# Patient Record
Sex: MALE | Race: WHITE
[De-identification: names, ages, dates, MRNs, and addresses within clinical notes are randomized per-mention and may not be internally consistent; named-entity substitution may affect disease eponyms.]

---

## 2019-02-25 ENCOUNTER — HOSPITAL ENCOUNTER (INPATIENT)
Dept: HOSPITAL 74 - YASAS | Age: 54
LOS: 2 days | Discharge: LEFT BEFORE DISCHARGE | DRG: 770 | End: 2019-02-27
Attending: SURGERY | Admitting: SURGERY
Payer: COMMERCIAL

## 2019-02-25 VITALS — BODY MASS INDEX: 21.7 KG/M2

## 2019-02-25 DIAGNOSIS — F14.20: ICD-10-CM

## 2019-02-25 DIAGNOSIS — R55: ICD-10-CM

## 2019-02-25 DIAGNOSIS — Z87.09: ICD-10-CM

## 2019-02-25 DIAGNOSIS — Z21: ICD-10-CM

## 2019-02-25 DIAGNOSIS — F10.230: Primary | ICD-10-CM

## 2019-02-25 DIAGNOSIS — Z86.69: ICD-10-CM

## 2019-02-25 DIAGNOSIS — R63.4: ICD-10-CM

## 2019-02-25 DIAGNOSIS — F11.20: ICD-10-CM

## 2019-02-25 PROCEDURE — HZ2ZZZZ DETOXIFICATION SERVICES FOR SUBSTANCE ABUSE TREATMENT: ICD-10-PCS | Performed by: SURGERY

## 2019-02-25 PROCEDURE — G0008 ADMIN INFLUENZA VIRUS VAC: HCPCS

## 2019-02-25 RX ADMIN — NICOTINE SCH MG: 21 PATCH TRANSDERMAL at 17:46

## 2019-02-25 RX ADMIN — NICOTINE POLACRILEX PRN MG: 2 GUM, CHEWING ORAL at 17:51

## 2019-02-25 RX ADMIN — Medication SCH MG: at 22:40

## 2019-02-25 NOTE — HP
CIWA Score


Nausea/Vomiting: 3


Muscle Tremors: 3


Anxiety: 2


Agitation: 2


Paroxysmal Sweats: 1-Minimal Palms Moist


Orientation: 0-Oriented


Tacttile Disturbances: 1-Very Mild Itch/Numbness


Auditory Disturbances: 1-Very Mild


Visual Disturbances: 0-None


Headache: 2-Mild


CIWA-Ar Total Score: 15





- Admission Criteria


[facility] Guidelines: Admission for Medically Managed Detox: 


Requires at least one of the followin. CIWA greater than 12


2. Seizures within the past 24 hours


3. Delirium tremens within the past 24 hours


4. Hallucinations within the past 24 hours


5. Acute intervention needed for co  occurring medical disorder


6. Acute intervention needed for co  occurring psychiatric disorder


7. Severe withdrawal that cannot be handled at a lower level of care (continued


    vomiting, continued diarrhea, abnormal vital signs) requiring intravenous


    medication and/or fluids


8. Pregnancy





Patient presents the following: CIWA greater than 12


Admission Criteria Met: Admission criteria met





Admission ROS [facility]





- HPI


Chief Complaint: 





i need help to stop drinking alcohol and cocaine,mmtp 30 mgs/day,heroin abused


Allergies/Adverse Reactions: 


 Allergies











Allergy/AdvReac Type Severity Reaction Status Date / Time


 


No Known Allergies Allergy   Verified 19 14:43











History of Present Illness: 





this 53 years old male with alcohol,cocaine,dependence,heroin abused,mmtp 30 mgs

/day,last medicated today


last detox [facility] 10 years ago


hiv since 2013 non compliance


weight loss


longest period of sobriety 5 years


seizure last since age of 7 years


asthma since childhood


plan to go to rehab after detox





Exam Limitations: No Limitations





- Ebola screening


Have you traveled outside of the country in the last 21 days: No


Have you had contact with anyone from an Ebola affected area: No


Have you been sick,other than usual withdrawal symptoms: No





- Review of Systems


Constitutional: Loss of Appetite, Malaise, Night Sweats, Changes in sleep, 

Weakness, Unintentional Wgt. Loss


EENT: reports: Tearing, Nose Congestion


Respiratory: reports: No Symptoms reported


Cardiac: reports: No Symptoms Reported


GI: reports: Nausea, Poor Appetite, Indigestion, Abdominal cramping


: reports: No Symptoms Reported


Musculoskeletal: reports: See HPI, Back Pain, Joint Pain, Muscle Pain


Integumentary: reports: Dryness


Neuro: reports: Headache, Tremors


Endocrine: reports: No Symptoms Reported


Hematology: reports: No Symptoms Reported, Other (hiv since  non compliance)


Psychiatric: reports: No Sypmtoms Reported, Judgement Intact, Mood/Affect 

Appropiate, Orientated x3, Depressed, other


Other Systems: Reviewed and Negative





Patient History





- Patient Medical History


Hx Anemia: No


Hx Asthma: Yes (childhood [place])


Hx Chronic Obstructive Pulmonary Disease (COPD): No


Hx Cancer: No


Hx Cardiac Disorders: No


Hx Congestive Heart Failure: No


Hx Hypertension: No


Hx Hypercholesterolemia: No


Hx Pacemaker: No


HX Cerebrovascular Accident: No


Hx Seizures: Yes (at age of 7 years old)


Hx Dementia: No


Hx Diabetes: No


Hx Gastrointestinal Disorders: No


Hx Liver Disease: No


Hx Genitourinary Disorders: No


Hx Sexually Transmitted Disorders: No


Hx Renal Disease (ESRD): No


Hx Thyroid Disease: No


Hx Human Immunodeficiency Virus (HIV): Yes (since  non compliance)


Hx Hepatitis C: Yes (treated)


Hx Depression: Yes


Hx Suicide Attempt: No


Hx Bipolar Disorder: No


Hx Schizophrenia: No


Other Medical History: no suicidal,no homicidal





- Patient Surgical History


Past Surgical History: No





- PPD History


Previous Implant?: Yes


Documented Results: Negative w/o proof


Implanted On Prior [facility] Admission?: No


PPD to be Administered?: Yes





- Smoking Cessation


Smoking history: Current every day smoker


Have you smoked in the past 12 months: Yes


Aproximately how many cigarettes per day: 20


Cigars Per Day: 0


Hx Chewing Tobacco Use: No


Initiated information on smoking cessation: Yes


'Breaking Loose' booklet given: 19





- Substance & Tx. History


Hx Alcohol Use: Yes


Hx Substance Use: Yes


Substance Use Type: Alcohol, Cocaine, Heroin


Hx Substance Use Treatment: Yes ([facility] )





- Substances Abused


  ** Alcohol


Route: Oral


Frequency: Daily


Amount used: 2 qts of beer


Age of first use: 22


Date of Last Use: 19





  ** Cocaine


Route: Smoking


Frequency: Daily


Amount used: $100 and up


Age of first use: 22


Date of Last Use: 19





  ** Heroin


Route: Injection


Frequency: 1-2 times per week


Amount used: 2-3 bags


Age of first use: 19


Date of Last Use: 19





Family Disease History





- Family Disease History


Family History: Denies





Admission Physical Exam [facility]





- Vital Signs


Vital Signs: 


 Vital Signs - 24 hr











  19





  13:51


 


Temperature 96.8 F L


 


Pulse Rate 68


 


Respiratory 16





Rate 


 


Blood Pressure 91/55 L














- Physical


General Appearance: Yes: Moderate Distress, Tremorous, Irritable, Sweating, 

Anxious


HEENTM: Yes: Normal ENT Inspection, EDMUND, Pharynx Normal


Respiratory: Yes: Lungs Clear, Normal Breath Sounds, No Respiratory Distress


Neck: Yes: Within Normal Limits, Supple, Trachea in good position


Breast: Yes: Within Normal Limits


Cardiology: Yes: Within Normal Limits, Regular Rhythm, Regular Rate, S1, S2


Abdominal: Yes: Within Normal Limits, Normal Bowel Sounds, Non Tender, Flat, 

Soft


Genitourinary: Yes: Within Normal Limits


Back: Yes: Muscle Spasm


Musculoskeletal: Yes: full range of Motion, Back pain, Muscle Pain


Extremities: Yes: Tremors


Neurological: Yes: CNs II-XII NML intact, Fully Oriented, Alert, Motor Strength 

5/5


Integumentary: Yes: Dry


Lymphatic: Yes: Within Normal Limits





- Diagnostic


(1) Alcohol dependence with uncomplicated withdrawal


Current Visit: Yes   Status: Acute   





(2) Cocaine dependence


Current Visit: Yes   Status: Acute   





(3) Heroin abuse


Current Visit: Yes   Status: Acute   





(4) Methadone maintenance therapy patient


Current Visit: Yes   Status: Acute   





(5) Syncope


Current Visit: Yes   Status: Acute   





(6) Seizure


Current Visit: Yes   Status: Acute   





(7) HIV (human immunodeficiency virus infection)


Current Visit: Yes   Status: Acute   





(8) Weight loss


Current Visit: Yes   Status: Acute   





Cleared for Admission [facility]





- Detox or Rehab


[facility] Level of Care: Medically Managed


Detox Regimen/Protocol: Librium





[facility] Breath Alcohol Content


Breath Alcohol Content: 0





Urine Drug Screen





- Results


Drug Screen Negative: No


Urine Drug Screen Results: AMY-Cocaine, OPI-Opiates, BZO-Benzodiazepines, MTD-

Methadone, FEN-Fentanyl





Inpatient Rehab Admission





- Rehab Decision to Admit


Inpatient rehab admission?: No

## 2019-02-26 LAB
ALBUMIN SERPL-MCNC: 2.8 G/DL (ref 3.4–5)
ALP SERPL-CCNC: 72 U/L (ref 45–117)
ALT SERPL-CCNC: 12 U/L (ref 13–61)
ANION GAP SERPL CALC-SCNC: 7 MMOL/L (ref 8–16)
AST SERPL-CCNC: 8 U/L (ref 15–37)
BILIRUB SERPL-MCNC: 0.3 MG/DL (ref 0.2–1)
BUN SERPL-MCNC: 13 MG/DL (ref 7–18)
CALCIUM SERPL-MCNC: 8.3 MG/DL (ref 8.5–10.1)
CHLORIDE SERPL-SCNC: 105 MMOL/L (ref 98–107)
CO2 SERPL-SCNC: 29 MMOL/L (ref 21–32)
CREAT SERPL-MCNC: 0.7 MG/DL (ref 0.55–1.3)
DEPRECATED RDW RBC AUTO: 13.6 % (ref 11.9–15.9)
GLUCOSE SERPL-MCNC: 86 MG/DL (ref 74–106)
HCT VFR BLD CALC: 34 % (ref 35.4–49)
HGB BLD-MCNC: 11.9 GM/DL (ref 11.7–16.9)
MCH RBC QN AUTO: 30.5 PG (ref 25.7–33.7)
MCHC RBC AUTO-ENTMCNC: 35 G/DL (ref 32–35.9)
MCV RBC: 87.2 FL (ref 80–96)
PLATELET # BLD AUTO: 223 K/MM3 (ref 134–434)
PMV BLD: 8.3 FL (ref 7.5–11.1)
POTASSIUM SERPLBLD-SCNC: 3.8 MMOL/L (ref 3.5–5.1)
PROT SERPL-MCNC: 6.2 G/DL (ref 6.4–8.2)
RBC # BLD AUTO: 3.9 M/MM3 (ref 4–5.6)
SICKLE CELL SCREEN: NEGATIVE
SODIUM SERPL-SCNC: 141 MMOL/L (ref 136–145)
WBC # BLD AUTO: 3.6 K/MM3 (ref 4–10)

## 2019-02-26 RX ADMIN — NICOTINE POLACRILEX PRN MG: 2 GUM, CHEWING ORAL at 13:13

## 2019-02-26 RX ADMIN — NICOTINE SCH MG: 21 PATCH TRANSDERMAL at 09:59

## 2019-02-26 RX ADMIN — Medication SCH: at 23:02

## 2019-02-26 NOTE — PN
S CIWA





- CIWA Score


Nausea/Vomitin-Mild Nausea/No Vomiting


Muscle Tremors: 3


Anxiety: 2


Agitation: 1-Slight > Activity


Paroxysmal Sweats: 1-Minimal Palms Moist


Orientation: 1-Uncertain about Date


Tacttile Disturbances: 0-None


Auditory Disturbances: 0-None


Visual Disturbances: 0-None


Headache: 2-Mild


CIWA-Ar Total Score: 11





[facility] Progress Note (SOAP)


Subjective: 





tremor sweating anxiety restlessness


methadone 30 mg po daily


Objective: 





19 11:03


 Vital Signs











Temperature  98.9 F   19 09:48


 


Pulse Rate  68   19 09:48


 


Respiratory Rate  18   19 09:48


 


Blood Pressure  112/76   19 09:48


 


O2 Sat by Pulse Oximetry (%)      








lab pending


Assessment: 





19 11:04


alcohol withdrawal sx


19 11:04





Plan: 





continue alcohol detox

## 2019-02-26 NOTE — EKG
Test Reason : 

Blood Pressure : ***/*** mmHG

Vent. Rate : 064 BPM     Atrial Rate : 064 BPM

   P-R Int : 124 ms          QRS Dur : 088 ms

    QT Int : 420 ms       P-R-T Axes : 049 066 056 degrees

   QTc Int : 433 ms

 

NORMAL SINUS RHYTHM

NORMAL ECG

NO PREVIOUS ECGS AVAILABLE

Confirmed by Antolin Dave MD ([id number]) on 2/26/2019 11:41:44 AM

 

Referred By:             Confirmed By:Antolin Dave MD

## 2019-02-27 VITALS — HEART RATE: 65 BPM | DIASTOLIC BLOOD PRESSURE: 81 MMHG | SYSTOLIC BLOOD PRESSURE: 129 MMHG | TEMPERATURE: 97 F

## 2019-02-27 NOTE — DS
[facility] Detox Discharge Summary


Admission Date: 


02/25/19





Discharge Date: 02/27/19





- History


Present History: Alcohol Dependence


Additional Comments: 





53 years old male admitted on 02/25/19 for alcohol withdrawal stabilization


insists to leave the detox unit 


patient prefers "up state" detox and rehab facility"


multidisciplinary discuss possible up state referral that the patient agrees to 

return to methadone maintenance treatment program for medical mental and 

addiction issues 


Pertinent Past History: 





[facility] is alert [facility]





- Physical Exam Results


Vital Signs: 


 Vital Signs











Temperature  97 F L  02/27/19 06:49


 


Pulse Rate  65   02/27/19 06:49


 


Respiratory Rate  16   02/27/19 06:49


 


Blood Pressure  129/81   02/27/19 06:49


 


O2 Sat by Pulse Oximetry (%)      











Pertinent Admission Physical Exam Findings: 





alcohol withdrawal sx


 Laboratory Last Values











WBC  3.6 K/mm3 (4.0-10.0)  L  02/26/19  07:00    


 


RBC  3.90 M/mm3 (4.00-5.60)  L  02/26/19  07:00    


 


Hgb  11.9 GM/dL (11.7-16.9)   02/26/19  07:00    


 


Hct  34.0 % (35.4-49)  L  02/26/19  07:00    


 


MCV  87.2 fl (80-96)   02/26/19  07:00    


 


MCH  30.5 pg (25.7-33.7)   02/26/19  07:00    


 


MCHC  35.0 g/dl (32.0-35.9)   02/26/19  07:00    


 


RDW  13.6 % (11.9-15.9)   02/26/19  07:00    


 


Plt Count  223 K/MM3 (134-434)   02/26/19  07:00    


 


MPV  8.3 fl (7.5-11.1)   02/26/19  07:00    


 


Sickle Cell Screen  Negative  (NEGATIVE)   02/26/19  07:00    


 


Sodium  141 mmol/L (136-145)   02/26/19  07:00    


 


Potassium  3.8 mmol/L (3.5-5.1)   02/26/19  07:00    


 


Chloride  105 mmol/L ()   02/26/19  07:00    


 


Carbon Dioxide  29 mmol/L (21-32)   02/26/19  07:00    


 


Anion Gap  7 MMOL/L (8-16)  L  02/26/19  07:00    


 


BUN  13 mg/dL (7-18)   02/26/19  07:00    


 


Creatinine  0.7 mg/dL (0.55-1.3)   02/26/19  07:00    


 


Creat Clearance w eGFR  > 60  (>60)   02/26/19  07:00    


 


Random Glucose  86 mg/dL ()   02/26/19  07:00    


 


Calcium  8.3 mg/dL (8.5-10.1)  L  02/26/19  07:00    


 


Total Bilirubin  0.3 mg/dL (0.2-1)   02/26/19  07:00    


 


AST  8 U/L (15-37)  L  02/26/19  07:00    


 


ALT  12 U/L (13-61)  L  02/26/19  07:00    


 


Alkaline Phosphatase  72 U/L ()   02/26/19  07:00    


 


Total Protein  6.2 g/dl (6.4-8.2)  L  02/26/19  07:00    


 


Albumin  2.8 g/dl (3.4-5.0)  L  02/26/19  07:00    


 


RPR Titer  Nonreactive  (NONREACTIVE)   02/26/19  07:00    








lab noted


keep medication list in wallet


bring-in bottles of medication and medication list to aftercare appointments


update medication list when change of medication


important of medication adherence as well as infectious disease specialist 

follow up





- Treatment


Hospital Course: Detox Protocol Followed, Responded well


Patient has Accepted a Rehab Referral to: [facility] 





- Medication


Discharge Medications: 


Ambulatory Orders





Methadone [Dolophine -] 30 mg PO DAILY 02/25/19 











- Diagnosis


(1) Alcohol dependence with uncomplicated withdrawal


Status: Acute   





(2) HIV (human immunodeficiency virus infection)


Status: Chronic   


Qualifiers: 


   HIV symptom status: asymptomatic   Qualified Code(s): Z21 - Asymptomatic 

human immunodeficiency virus [HIV] infection status   





(3) Methadone maintenance therapy patient


Status: Chronic   





(4) Weight loss


Status: Acute   





- AMA


Did Patient Leave Against Medical Advice: Yes